# Patient Record
Sex: FEMALE | Race: BLACK OR AFRICAN AMERICAN
[De-identification: names, ages, dates, MRNs, and addresses within clinical notes are randomized per-mention and may not be internally consistent; named-entity substitution may affect disease eponyms.]

---

## 2021-02-16 ENCOUNTER — HOSPITAL ENCOUNTER (OUTPATIENT)
Dept: HOSPITAL 35 - PAIN | Age: 31
End: 2021-02-16
Attending: ANESTHESIOLOGY
Payer: COMMERCIAL

## 2021-02-16 VITALS — DIASTOLIC BLOOD PRESSURE: 68 MMHG | SYSTOLIC BLOOD PRESSURE: 118 MMHG

## 2021-02-16 VITALS — HEIGHT: 64.02 IN | WEIGHT: 160 LBS | BODY MASS INDEX: 27.31 KG/M2

## 2021-02-16 DIAGNOSIS — R20.2: ICD-10-CM

## 2021-02-16 DIAGNOSIS — M54.2: ICD-10-CM

## 2021-02-16 DIAGNOSIS — M79.605: ICD-10-CM

## 2021-02-16 DIAGNOSIS — M54.5: Primary | ICD-10-CM

## 2021-02-16 DIAGNOSIS — M41.9: ICD-10-CM

## 2021-02-16 NOTE — NUR
Pain Clinic Assessment:
 
1. History of Osteoarthritis:
Not Applicable
   History of Rheumatoid Arthritis:
Not Applicable
 
2. Height: 5 ft. 4 in. 162.6 cm.
   Weight: 160.0 lb.  oz. 72.576 kg.
   Patient's BMI: 27.5
 
3. Vital Signs:
   BP: 118/68 Pulse: 94 Resp: 14
   Temp:  02 Sat: 99 ECG Mon:
 
4. Pain Intensity: 7
 
5. Fall Risk:
   Dizziness: N  Needs help standing or walking: N
   Fallen in the last 3 months: N
   Fall risk comments:
 
 
6. Patient on Blood Thinner: None
 
7. History of Hypertension: N
 
8. Opioid Therapy greater than 6 weeks: N
   Opiate Contract Signed:
 
9. Risk Assessment Tool Provided: MODERATE
 
10. Functional Assessment Tool: 67/70
 
11. Recreational Drug Use: Current within past 3 mos Drug Type: MARIJUANIA
    Tobacco Use: Never Smoker Tobacco Type:
       Amount or Packs/day:  How Many Years:
    Alcohol Use: Yes  Frequency: Daily Quant: 1

## 2021-02-17 NOTE — HPC
[facility]
[address]
[place]   [address]                     PAIN MANAGEMENT CONSULTATION  
_______________________________________________________________________________
 
Name:       DADA CORDOVA                 Room #:                     REG ANNE SUAREZYaniraJASONYanira#:      [id number]                       Account #:      [id number]  
Admission:  02/16/21    Attend Phys:    Bonifacio Noriega DO  
Discharge:              Date of Birth:  [birth year]  
                                                          Report #: [id number]
                                                                    [id number]   
_______________________________________________________________________________
THIS REPORT FOR:  
 
cc:  Moreno Russell MD,Moreno Noriega,Bonifacio MOREIRA DO                                          ~
DATE OF SERVICE:  02/16/2021
 
 
CHIEF COMPLAINT:  Low back pain, left lower extremity pain with paresthesias,
intermittent mid back pain and neck pain.
 
HISTORY OF PRESENT ILLNESS:  As you know, the patient is a 30-year-old female
who reports longstanding history of low back pain, left anterior thigh pain
beginning 10/01/2009.  The patient reports no specific injury or trauma that
have led to that symptom development.  She has trialled over-the-counter
medications, rest, relaxation without improvement in symptoms.  She has been
treated by multiple physicians over the past couple of years seeking
chiropractic manipulation, massage therapy.  She has been started and stopped on
Flexeril, naproxen, ibuprofen and Robaxin.  She has had fentanyl patches and
utilizes marijuana.  Despite all these potential treatment options she continued
to experience pain.  She sought evaluation through neurosurgery, who advised the
patient there is no surgical necessity and she only has mild scoliotic
curvature.  She was subsequently referred to our clinic to discuss undergoing
interventional treatments to address axial back pain and left lower extremity
pain.
 
The patient reports today her pain is continuous, steady and constant with
intermittent exacerbations.  She states her pain is shooting, cramping, aching,
crushing, pulling, throbbing, pounding, sharp, stabbing and tender, places
current pain score 7/10, daily average anywhere from 8-10/10, worst pain has
been is 16/10.  The patient states pain is exacerbated with "anything."  Nothing
tends to improve pain.  The patient has recently started physical therapy and
states that this makes her pain worse and I encouraged her to continue this
treatment.  She has been referred to our clinic to discuss interventional
treatment options.
 
PAST MEDICAL HISTORY:
1.  Asthma.
2.  Hypothyroidism.
3.  Chronic back pain.
 
PAST SURGICAL HISTORY:  None.
 
SOCIAL HISTORY:  The patient denies tobacco use.  She smokes marijuana to help
with her back symptoms.  She admits to 1 alcohol beverage per day.  She is a
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
 
 
 
[address]   [address]                     PAIN MANAGEMENT CONSULTATION  
_______________________________________________________________________________
 
Name:       DADA CORDOVA                 Room #:                     REG CLVirtua MarltonYanira#:      [id number]                       Account #:      [id number]  
Admission:  02/16/21    Attend Phys:    Bonifacio Noriega DO  
Discharge:              Date of Birth:  [birth year]  
                                                          Report #: [id number]
                                                                    [id number]   
_______________________________________________________________________________
is she trying to obtain discrete benefits.  She is not in litigation in regards
to pain.  She is unaccompanied today.
 
REVIEW OF SYSTEMS:  Positive for eye disease, wearing corrective eyewear,
blurred and double vision, asthma, wheezing, abdominal pain, irregular menses,
lightheadedness and dizziness, numbness and tingling sensations, history of
paralysis, psychogenic nervousness, depression, thyroid disease.  All other
review of systems negative per 12-point review of systems other than those
listed in history of present illness.  Pain impact score 67 of 70, near complete
interference of daily activities secondary to pain.
 
ALLERGIES:  No known drug allergies.
 
CURRENT MEDICATIONS:  Lidoderm patch apply topically every 12 hours,
diphenhydramine 25 mg p.o. at bedtime, methocarbamol 750 mg b.i.d., naproxen 500
mg t.i.d., cyclobenzaprine 10 mg t.i.d. p.r.n.
 
IMAGING:  X-ray of the lumbar spine obtained on 01/25/2021 shows mild
thoracolumbar scoliosis with curvature with convexity to the right, vertebral
heights are well maintained,  mild disk narrowing at the L5-S1 level.
 
PHYSICAL EXAMINATION:
VITAL SIGNS:  Blood pressure 118/68, pulse 94, respiratory rate 14 and
unlabored.  The patient is 99% on room air.  Height 5 feet 4 inches tall, weight
160 pounds, BMI calculated 27.5.
GENERAL:  Well-developed, well-nourished, well-hydrated 30-year-old female
appearing her stated age.  She is in no acute distress, awake, alert and
oriented x 3.  Current pain is rated at 7/10.
HEENT:  Normocephalic, atraumatic.  Pupils equal, round, and responsive. 
Extraocular muscles are intact.  Sclerae nonicteric without injection.  Speech
appears fluent.  The patient is wearing a mask in compliance with COVID-19
regulations.  Trachea is midline.  No palpable masses, or swelling.
LUNGS:  Clear, no wheeze, rhonchi or rales.
CARDIOVASCULAR:  Regular.  No appreciable gallop, no rub.
ABDOMEN:  Soft, nontender, nondistended, normoactive bowel sounds.
EXTREMITIES:  Show no clubbing, no cyanosis, no edema.
MUSCULOSKELETAL:  Lower extremity strength equal and symmetrical 5/5, intact to
light touch from L1 through S2 dermatomes.  Seated straight leg raising
negative.  Supine straight leg raising negative.  Amita test is negative. 
Modified Gaenslen's positive for axial low back pain.  Ankle clonus negative. 
Babinski is negative.  Romberg's negative.  The patient is able to toe walk,
heel walk and tandem gait without any issues.  Reflexes are 2+/4 bilaterally in
the patella and Achilles as well as the biceps, brachioradialis and triceps.
 
ASSESSMENT:
1.  Chronic low back pain.
 
 
 
[facility]
[address]
[place]   [address]                     PAIN MANAGEMENT CONSULTATION  
_______________________________________________________________________________
 
Name:       DADA CORDOVA                 Room #:                     REG ANNE AYALA#:      [id number]                       Account #:      [id number]  
Admission:  02/16/21    Attend Phys:    Bonifacio Noriega DO  
Discharge:              Date of Birth:  [birth year]  
                                                          Report #: [id number]
                                                                    [id number]   
_______________________________________________________________________________
2.  Left lower extremity pain.
3.  Mild dextroscoliosis.
4.  Chronic intractable pain.
 
PLAN:
1.  The patient has been referred to our service by her neurosurgery team to
discuss interventional treatment options to address axial back and left lower
extremity symptoms.  I was unable to elicit any type of radicular component of
the patient's symptoms today.  She does have some myofascial tenderness over the
paraspinal musculature of the thoracic and lumbar area, but no spinous process
tenderness.  The patient is complaining of pain beginning in the low back and
radiating down the left leg, which could be due to mild changes noted at the
L5-S1 level.  We discussed with the patient that treatment options would include
the following.
2.  We discussed physical therapy, stretching exercises and core strengthening
as the gold standard and likely the most effective treatment option.  We
discussed utilization of nonsteroidal anti-inflammatories.  We would strongly
suggest no opioid medication as there is no noted pathology, but we will defer
to the primary team who has been writing opioid medications in the past.  We
concur with Dr. Russell in regards to the use of opioid medications as ineffectual
treating current symptoms.  We discussed interventional treatment options for
which the patient was referred to our clinic.  These would include epidural
injections and trigger point injections.  We also discussed with the patient the
surgical options that she has sought which has been advised she is nonsurgical
candidate.  After reviewing the risks and benefits of all the proposed treatment
options, the patient chose to begin with an epidural injection.
3.  The patient will establish an appointment to see us back to undergo first in
a series of lumbar epidural injections to address low back and left lower
extremity symptoms.  We are hopeful the patient can undergo the procedure quite
quickly.  We will discuss her case with her third party payer to confirm that
authorizations are completed if they are necessary and have her return next week
for the first in a series of epidural injections.  The patient is to clear her
schedule after the injection to go home and take it easy for at least the first
24 hours.  The patient is agreeable with plan.  She will make an appointment
next week to undergo the first in a series of epidural injections.
4.  No medication changes made at today's visit.  We recommend the patient
remain on current medications.  She is on Lidoderm patch and naproxen, which are
appropriate and consistent with the patient's symptoms.
5.  We plan to see the patient back in followup visit once we have confirmed
authorizations that may be necessary for the epidural injection and the patient
is able to clear her schedule to undergo the procedure.  We will keep you
apprised of response once this procedure has been performed.
6.  We wish to thank Dr. Russell for the referral of patient to our clinic.  We
will keep you apprised of response to treatment and whether or not she sees
analgesic benefit with the treatment options provided.  It does appear that
based on Dr. Russell's notes that if she does not see improvement, she is to
 
 
 
[address]   [address]                     PAIN MANAGEMENT CONSULTATION  
_______________________________________________________________________________
 
Name:       DADA CORDOVA                 Room #:                     REG ANNE PAULYanira#:      [id number]                       Account #:      [id number]  
Admission:  02/16/21    Attend Phys:    Bonifacio Noriega DO  
Discharge:              Date of Birth:  [birth year]  
                                                          Report #: [id number]
                                                                    [id number]   
_______________________________________________________________________________
follow up with Dr. Quintero in regards to surgical options if they are to be
entertained.  Again, we wish to thank Dr. Russell for the referral of the patient
to our clinic.
 
 
 
 
 
 
 
 
 
 
 
 
 
 
 
 
 
 
 
 
 
 
 
 
 
 
 
 
 
 
 
 
 
 
 
 
 
 
 
 
 
  <ELECTRONICALLY SIGNED>
   By: Bonifacio Noriega DO          
  02/17/21     1142
D: 02/16/21 1451                           _____________________________________
T: 02/16/21 1756                           Bonifacio Noriega DO            /nt

## 2021-02-23 ENCOUNTER — HOSPITAL ENCOUNTER (OUTPATIENT)
Dept: HOSPITAL 35 - PAIN | Age: 31
Discharge: HOME | End: 2021-02-23
Attending: ANESTHESIOLOGY
Payer: COMMERCIAL

## 2021-02-23 VITALS — DIASTOLIC BLOOD PRESSURE: 77 MMHG | SYSTOLIC BLOOD PRESSURE: 109 MMHG

## 2021-02-23 VITALS — BODY MASS INDEX: 22.91 KG/M2 | HEIGHT: 64.02 IN | WEIGHT: 134.2 LBS

## 2021-02-23 DIAGNOSIS — M79.605: ICD-10-CM

## 2021-02-23 DIAGNOSIS — G89.29: ICD-10-CM

## 2021-02-23 DIAGNOSIS — M54.5: Primary | ICD-10-CM

## 2021-02-23 DIAGNOSIS — Z79.899: ICD-10-CM

## 2021-02-23 NOTE — NUR
Pain Clinic Assessment:
 
1. History of Osteoarthritis:
Not Applicable
   History of Rheumatoid Arthritis:
Not Applicable
 
2. Height: 5 ft. 4 in. 162.6 cm.
   Weight: 134.2 lb.  oz. 60.873 kg.
   Patient's BMI: 23.0
 
3. Vital Signs:
   BP: 109/77 Pulse: 101 Resp: 14
   Temp:  02 Sat: 100 ECG Mon:
 
4. Pain Intensity: 7
 
5. Fall Risk:
   Dizziness: N  Needs help standing or walking: N
   Fallen in the last 3 months: N
   Fall risk comments:
 
 
6. Patient on Blood Thinner: None
 
7. History of Hypertension: N
 
8. Opioid Therapy greater than 6 weeks: N
   Opiate Contract Signed:
 
9. Risk Assessment Tool Provided: MODERATE
 
10. Functional Assessment Tool: 67/70
 
11. Recreational Drug Use: Current within past 3 mos Drug Type: MARIJUANA
    Tobacco Use: Never Smoker Tobacco Type:
       Amount or Packs/day:  How Many Years:
    Alcohol Use: Yes  Frequency: Weekly Quant: 1-2

## 2021-02-24 NOTE — HPC
[facility]
[place]   [address]                     PAIN MANAGEMENT CONSULTATION  
_______________________________________________________________________________
 
Name:       DADA CORDOVA                 Room #:                     REG ANNE SUAREZYaniraJASON.#:      [id number]                       Account #:      [id number]  
Admission:  02/23/21    Attend Phys:    Bonifacio Noriega DO  
Discharge:              Date of Birth:  [birth year]  
                                                          Report #: [id number]
                                                                    [id number]   
_______________________________________________________________________________
THIS REPORT FOR:  
 
cc:  Morneo Russell MD,Moreno Noriega,Bonifacio MOREIRA DO                                          ~
DATE OF SERVICE:  02/23/2021
 
 
REFERRING PHYSICIAN:  Moreno Russell MD
 
CHIEF COMPLAINT:  Low back pain, left lower extremity pain with paresthesias.
 
HISTORY OF PRESENT ILLNESS:  As you know, the patient is a 30-year-old female
with longstanding history of chronic low back pain, left anterior thigh pain
beginning 10/01/2009.  No specific injury or trauma was noted to be the source
of symptoms.  She has trialled various treatment options ultimately seeking
treatment through Neurosurgery, who advised the patient to trial more
conservative treatment options initially.  She was seen in consultation per the
request of Dr. Russell on 02/16/2021 due to third party payer restrictions,
authorization had to be obtained before the patient could undergo first in a
series of lumbar epidural injections.  She also needed to clear her schedule, to
be able to rest and relax after the injection for at least the first 24 hours. 
She returns today in followup visit reporting a pain score of 7/10, requesting
to undergo lumbar epidural injection under fluoroscopic guidance.
 
ALLERGIES:  NO DRUG ALLERGIES.
 
CURRENT MEDICATIONS:  Lidoderm patch apply topically every 12 hours,
diphenhydramine 25 mg p.o. at bedtime, methocarbamol 750 mg b.i.d., naproxen 500
mg t.i.d., cyclobenzaprine 10 mg p.r.n.
 
SOCIAL HISTORY:  The patient denies tobacco use.  She smokes marijuana.  Denies
IV or illicit drug use.  Admits to one alcohol beverage per day.  She is a
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
 
IMAGING STUDIES:  No new imaging available.
 
PHYSICAL EXAMINATION:
VITAL SIGNS:  Blood pressure 109/77, pulse 101, respiratory rate 14 and
unlabored.  The patient is 100% on room air.  Height 5 feet 4 inches tall,
weight 134.2 pounds, BMI calculated 23.0.
GENERAL:  Well-developed, well-nourished, well-hydrated 30-year-old female
appearing stated age, pain is rated around 7/10.
HEENT:  Normocephalic and atraumatic.  Pupils are round and responsive.  The
patient is wearing a mask in compliance with COVID-19 regulations.
EXTREMITIES:  Show no clubbing, no cyanosis, no edema.
 
 
 
[place] [address]                     PAIN MANAGEMENT CONSULTATION  
_______________________________________________________________________________
 
Name:       DADA CORDOVA                 Room #:                     REG CLSTEFFI PAUL.#:      [id number]                       Account #:      [id number]  
Admission:  02/23/21    Attend Phys:    Bonifacio Noriega DO  
Discharge:              Date of Birth:  [birth year]  
                                                          Report #: [id number]
                                                                    [id number]   
_______________________________________________________________________________
MUSCULOSKELETAL:  Seated straight leg raising negative.  Supine straight leg
raising negative.  Amita's test negative.  Modified Gaenslen's positive for some
axial low back pain.  Ankle clonus negative.  Babinski is negative.
 
ASSESSMENT:
1.  Chronic low back pain.
2.  Left lower extremity pain.
3.  Mild dextroscoliosis.
4.  Chronic intractable pain.
 
PLAN:
1.  The patient returns today in followup visit to undergo lumbar epidural
injection under fluoroscopic guidance per the request of her referring
neurosurgeon, Dr. Moreno Russell to address axial back pain, left lower extremity
pain with paresthesias.  The patient has been advised of the risks and benefits
of a lumbar epidural injection.  These risks include but are not necessarily
limited to bleeding, bruising, infection, worsening pain, no relief of pain,
also risk of temporary or permanent muscle weakness, temporary or permanent
nerve damage, possible paralysis, post-dural puncture headache and death.  The
patient states understood and wished to proceed.
2.  No medication changes made at today's visit.  The patient will continue
current medical therapy as prior prescribed.
3.  We plan to see the patient back in followup visit in 31 days to discuss the
efficacy of today's epidural injection and determine if next in the series would
be recommended.
 
PROCEDURE NOTE
 
DESCRIPTION OF PROCEDURE:  Lumbar epidural steroid injection under fluoroscopic
guidance.
 
This is the first procedure of the first series that the patient is undergoing.
 
After obtaining written consent, the patient was taken back to the fluoroscopy
suite, placed in a prone position with pillow under the abdomen to decrease
lumbar lordosis.  The skin overlying the lumbosacral area was then prepped and
draped in aseptic fashion.  The L5-S1 vertebral interspace was then identified
by AP fluoroscopy.  The skin and subcutaneous tissue overlying the target site
of injection was anesthetized with 3 mL 1% lidocaine.
 
A(n) 20-gauge 3-1/2 inch Tuohy needle was then advanced under fluoroscopic
guidance towards the epidural space using a midline approach.  The epidural
space was identified using loss of resistance to air technique.  After negative
aspiration for heme or cerebrospinal fluid, a total of 1 mL of Omnipaque was
injected.  A lumbar epidurogram was confirmed using both AP and lateral 
fluoroscopy.  After negative aspiration for heme or cerebrospinal fluid, 5 mL of
 
 
 
[address]   [address]                     PAIN MANAGEMENT CONSULTATION  
_______________________________________________________________________________
 
Name:       DADA CORDOVA                 Room #:                     REG ANNE AYALA#:      [id number]                       Account #:      [id number]  
Admission:  02/23/21    Attend Phys:    Bonifacio Noriega DO  
Discharge:              Date of Birth:  [birth year]  
                                                          Report #: [id number]
                                                                    [id number]   
_______________________________________________________________________________
a solution containing 2 mL 40 mg per mL, 80 mg total triamcinolone along with 3
mL of lidocaine 1% was injected in increments.  Contrast spread was noted
posterior epidural space.  The needle was then retracted approximately half way
and needle tract flushed with 1 mL of 1% lidocaine.  Needle was then removed. 
There were no apparent sensory or motor deficits in the lower extremity
following the procedure.  A sterile bandage was placed over the injection site.
The heart rate, pulse, oximetry and blood pressure were continuously monitored
after the procedure.  There were no complications.  The patient tolerated the
procedure well and was carefully escorted to the recovery room in stable
condition.  There were no apparent complications.  After meeting discharge
criteria, the patient was then discharged home.
 
 
 
 
 
 
 
 
 
 
 
 
 
 
 
 
 
 
 
 
 
 
 
 
 
 
 
 
 
 
 
 
 
  <ELECTRONICALLY SIGNED>
   By: Bonifacio Noriega DO          
  02/24/21     1153
D: 02/23/21 1117                           _____________________________________
T: 02/23/21 1158                           Bonifacio Noriega DO            /nt

## 2021-03-30 ENCOUNTER — HOSPITAL ENCOUNTER (OUTPATIENT)
Dept: HOSPITAL 35 - PAIN | Age: 31
Discharge: HOME | End: 2021-03-30
Attending: ANESTHESIOLOGY
Payer: COMMERCIAL

## 2021-03-30 VITALS — DIASTOLIC BLOOD PRESSURE: 80 MMHG | SYSTOLIC BLOOD PRESSURE: 118 MMHG

## 2021-03-30 VITALS — BODY MASS INDEX: 23.56 KG/M2 | HEIGHT: 64.02 IN | WEIGHT: 138 LBS

## 2021-03-30 DIAGNOSIS — M79.605: ICD-10-CM

## 2021-03-30 DIAGNOSIS — M54.5: Primary | ICD-10-CM

## 2021-03-30 DIAGNOSIS — G89.29: ICD-10-CM

## 2021-03-30 DIAGNOSIS — Z79.899: ICD-10-CM

## 2021-03-30 DIAGNOSIS — M41.86: ICD-10-CM

## 2021-03-30 DIAGNOSIS — Z98.890: ICD-10-CM

## 2021-03-30 NOTE — NUR
Pain Clinic Assessment:
 
1. History of Osteoarthritis:
Not Applicable
   History of Rheumatoid Arthritis:
Not Applicable
 
2. Height: 5 ft. 4 in. 162.6 cm.
   Weight: 138.0 lb.  oz. 62.596 kg.
   Patient's BMI: 23.7
 
3. Vital Signs:
   BP: 118/80 Pulse: 80 Resp: 16
   Temp:  02 Sat: 100 ECG Mon:
 
4. Pain Intensity: 7.5
 
5. Fall Risk:
   Dizziness: N  Needs help standing or walking: N
   Fallen in the last 3 months: N
   Fall risk comments:
 
 
6. Patient on Blood Thinner: None
 
7. History of Hypertension: N
 
8. Opioid Therapy greater than 6 weeks: N
   Opiate Contract Signed:
 
9. Risk Assessment Tool Provided: MODERATE-4
 
10. Functional Assessment Tool: 67/70
 
11. Recreational Drug Use: Current within past 3 mos Drug Type: marijuana
    Tobacco Use: Never Smoker Tobacco Type:
       Amount or Packs/day:  How Many Years:
    Alcohol Use: Yes  Frequency: Weekly Quant: 1-2

## 2021-04-06 NOTE — HPC
[address] [place]   [address]                     PAIN MANAGEMENT CONSULTATION  
_______________________________________________________________________________
 
Name:       DADA CORDOVA                 Room #:                     REG ANNE SUAREZYaniraJASONYanira#:      [id number]                       Account #:      [id number]  
Admission:  03/30/21    Attend Phys:    Bonifacio Noriega DO  
Discharge:              Date of Birth:  [birth year]  
                                                          Report #: [id number]
                                                                    [id number]   
_______________________________________________________________________________
THIS REPORT FOR:  
 
cc:  Moreno Russell MD,Moreno Noriega,Bonifacio MOREIRA DO                                          ~
DATE OF SERVICE:  03/30/2021
 
 
CHIEF COMPLAINT:  Low back pain, left lower extremity pain with paresthesias.
 
HISTORY OF PRESENT ILLNESS:  As you know, the patient is a 30-year-old female
with longstanding history of chronic low back pain, anterior thigh pain that
presented 10/01/2009.  The patient denies any specific injury or trauma.  She
sought evaluation through Neurosurgery and advised to trial conservative
treatment initially to determine if her symptoms are amenable to a more
conservative approach before looking toward surgical options.  We saw the
patient in consultation, 02/16/2021, diagnosed with chronic low back pain, left
lower extremity pain and underwent an epidural injection under fluoroscopic
guidance on 02/23/2021.  She returns today in followup visit indicating that she
received improvement in symptoms of about 90%, lasting for about 3 weeks.  She
returns today in followup visit requesting to undergo next in the series of
lumbar epidural injections to address suspected lumbar radiculopathy.  She is
also requesting a possible adjustment in medication management.  Today, the
patient is placing pain score 7.5/10.
 
ALLERGIES:  No known drug allergies.
 
CURRENT MEDICATIONS:  Lidoderm patch apply topically every 12 hours,
diphenhydramine 25 mg once a day, methocarbamol 750 mg b.i.d., naproxen 500 mg
t.i.d., cyclobenzaprine 10 mg p.r.n.
 
SOCIAL HISTORY:  The patient denies tobacco, continues to smoke marijuana on a
near daily basis.  Denies IV or illicit drug use.  Admits to 1 alcohol beverage
per day.  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
 
IMAGING:  No new imaging available.
 
PHYSICAL EXAMINATION:
VITAL SIGNS:  Blood pressure 118/80, pulse 80, respiratory rate 16 and
unlabored.  The patient is 100% on room air.  Height 5 feet 4 inches tall,
weight 138 pounds, BMI calculated 23.7.
GENERAL:  A well-developed, well-nourished, well-hydrated 30-year-old female
appearing stated age, pain is rated today 7.5/10.
HEENT:  Normocephalic, atraumatic.  Pupils equal, round, and responsive.
EXTREMITIES:  Show no clubbing, no cyanosis.  No appreciable edema.
MUSCULOSKELETAL:  Seated straight leg raising negative.  Supine straight leg
 
 
 
[address]   [address]                     PAIN MANAGEMENT CONSULTATION  
_______________________________________________________________________________
 
Name:       DADA CORDOVA                 Room #:                     REG ANNE AYALA#:      [id number]                       Account #:      [id number]  
Admission:  03/30/21    Attend Phys:    Bonifacio Noriega DO  
Discharge:              Date of Birth:  [birth year]  
                                                          Report #: [id number]
                                                                    [id number]   
_______________________________________________________________________________
raising negative.  Amita's test is negative.  Gait is normal.  Modified
Gaenslen's positive for some mild axial low back pain.  Ankle clonus negative. 
Babinski is negative.
 
ASSESSMENT:
1.  Chronic low back pain.
2.  Left lower extremity pain.
3.  Mild dextroscoliosis.
4.  Chronic intractable pain.
 
PLAN:
1.  The patient returns today in followup visit requesting to undergo epidural
injection under fluoroscopic guidance.  She reports a 90% improvement in overall
pain with the epidural injection provided on 02/23/2021 and unfortunately, her
symptoms recurred.  She returns for the next in the series of epidural
injections in hopes of improving pain.  She has been advised risks and benefits
of the procedure, states understood and wished to proceed.  The patient was
advised to today's injection is the second in the series of epidural injections
in the 6 months.  We have one remaining epidural injection to provide to the
patient through 08/23/2020.  The patient and I did discuss that we should delay
the next in the series of injections as long as possible.
2.  The patient was provided a prescription of gabapentin 300 mg dose.  She will
begin 1 tab p.o. at bedtime.  Continue for 3 nights and then may escalate to 600
mg.  We will provide the patient the medication with the understanding that she
will follow up with her PCP to continue the therapy.  The patient will watch for
side effects of sleepiness, disorientation, confusion, mental slowing with use
of this medication.  She is not to take this medication with any other sedating
substances.
3.  We will see the patient back in followup visit for the next in the series of
epidural injections.  If the patient notes improvement, but her symptoms do
reoccur, we have one remaining epidural injection.  If we only see transient
improvement in symptoms with this injection, I would recommend follow up with
Neurosurgery to discuss her options.
 
DESCRIPTION OF PROCEDURE:  L5-S1 interlaminar epidural steroid injection under
fluoroscopic guidance.
 
This is the second procedure of the first series that the patient is undergoing.
 
After obtaining written consent, the patient was taken back to the fluoroscopy
suite, placed in a prone position with pillow under the abdomen to decrease
lumbar lordosis.  The skin overlying the lumbosacral area was then prepped and
draped in aseptic fashion.  The L5-S1 vertebral interspace was then identified
by AP fluoroscopy.  The skin and subcutaneous tissue overlying the target site
of injection was anesthetized with 3 mL 1% lidocaine.
 
 
 
 
[facility]
[address]
[place]   [address]                     PAIN MANAGEMENT CONSULTATION  
_______________________________________________________________________________
 
Name:       DADA CORDOVA                 Room #:                     REG ANNE AYALA#:      [id number]                       Account #:      [id number]  
Admission:  03/30/21    Attend Phys:    Bonifacio Noriega DO  
Discharge:              Date of Birth:  [birth year]  
                                                          Report #: [id number]
                                                                    [id number]   
_______________________________________________________________________________
A 20-gauge 3-1/2 inch Tuohy needle was then advanced under fluoroscopic guidance
towards the epidural space using a right parasagittal approach.  The epidural
space was identified using loss of resistance to air technique.  After negative
aspiration for heme or cerebrospinal fluid, a total of 1 mL of Omnipaque was
injected.  A lumbar epidurogram was confirmed using both AP and lateral
fluoroscopy.  After negative aspiration for heme or cerebrospinal fluid, 5 mL of
a solution containing 2 mL 40 mg per mL, 80 mg total triamcinolone along with 3
mL of lidocaine 1% mL was injected in increments.  Contrast spread was noted in
posterior epidural space.  The needle was then retracted approximately half way
and needle tract flushed with 1 mL of 1% lidocaine.  Needle was then removed. 
There were no apparent sensory or motor deficits in the lower extremity
following the procedure.  A sterile bandage was placed over the injection site.
The heart rate, pulse, oximetry and blood pressure were continuously monitored
after the procedure.  There were no apparent complications.  The patient
tolerated the procedure well and was carefully escorted to the recovery room in
stable condition.  There were no apparent complications.  After meeting
discharge criteria, the patient was then discharged home.
 
 
 
 
 
 
 
 
 
 
 
 
 
 
 
 
 
 
 
 
 
 
 
 
 
 
 
  <ELECTRONICALLY SIGNED>
   By: Bonifacio Noriega DO          
  04/06/21     0806
D: 03/31/21 0822                           _____________________________________
T: 03/31/21 1514                           Bonifacio Noriega DO            /nt

## 2021-05-11 ENCOUNTER — HOSPITAL ENCOUNTER (OUTPATIENT)
Dept: HOSPITAL 35 - PAIN | Age: 31
Discharge: HOME | End: 2021-05-11
Attending: ANESTHESIOLOGY
Payer: COMMERCIAL

## 2021-05-11 VITALS — DIASTOLIC BLOOD PRESSURE: 74 MMHG | SYSTOLIC BLOOD PRESSURE: 114 MMHG

## 2021-05-11 VITALS — BODY MASS INDEX: 25.13 KG/M2 | WEIGHT: 147.2 LBS | HEIGHT: 64.02 IN

## 2021-05-11 DIAGNOSIS — M79.605: ICD-10-CM

## 2021-05-11 DIAGNOSIS — M41.86: ICD-10-CM

## 2021-05-11 DIAGNOSIS — M54.5: Primary | ICD-10-CM

## 2021-05-11 DIAGNOSIS — Z79.899: ICD-10-CM

## 2021-05-11 DIAGNOSIS — M79.604: ICD-10-CM

## 2021-05-11 DIAGNOSIS — Z98.890: ICD-10-CM

## 2021-05-11 DIAGNOSIS — G89.29: ICD-10-CM

## 2021-05-11 NOTE — NUR
Pain Clinic Assessment:
 
1. History of Osteoarthritis:
Not Applicable
   History of Rheumatoid Arthritis:
Not Applicable
 
2. Height: 5 ft. 4 in. 162.6 cm.
   Weight: 147.2 lb.  oz. 66.769 kg.
   Patient's BMI: 25.3
 
3. Vital Signs:
   BP: 114/74 Pulse: 76 Resp: 14
   Temp:  02 Sat: 100 ECG Mon:
 
4. Pain Intensity: 9
 
5. Fall Risk:
   Dizziness: N  Needs help standing or walking: N
   Fallen in the last 3 months: N
   Fall risk comments:
 
 
6. Patient on Blood Thinner: None
 
7. History of Hypertension: N
 
8. Opioid Therapy greater than 6 weeks: N
   Opiate Contract Signed:
 
9. Risk Assessment Tool Provided: MODERATE-4
 
10. Functional Assessment Tool: 67/70
 
11. Recreational Drug Use: Current within past 3 mos Drug Type: MARIJUANA
    Tobacco Use: Never Smoker Tobacco Type:
       Amount or Packs/day:  How Many Years:
    Alcohol Use: Yes  Frequency: Special Occasions Quant: OCCASIONAL

## 2021-05-12 NOTE — HPC
[address]   [address]                     PAIN MANAGEMENT CONSULTATION  
_______________________________________________________________________________
 
Name:       DADA CORDOVA                 Room #:                     REG ANNE SUAREZYaniraJASON.#:      [id number]                       Account #:      [id number]  
Admission:  05/11/21    Attend Phys:    Bonifacio Noriega DO  
Discharge:              Date of Birth:  [birth year]  
                                                          Report #: [id number]
                                                                    [id number] 
_______________________________________________________________________________
THIS REPORT FOR:  
 
cc:  Moreno Russell MD,Moreno Noriega,Bonifacio MOREIRA DO                                          ~
DOC #: [id number]
 
 
DATE OF SERVICE: 05/11/2021
 
CHIEF COMPLAINT:  Low back pain, left lower extremity pain with paresthesias.
 
HISTORY OF PRESENT ILLNESS:  As you know, the patient is a 30-year-old female 
with longstanding history of chronic low back pain, anterior thigh pain, who was
referred to our clinic by her neurosurgeon, Dr. Moreno Russell to trial epidural 
injections under fluoroscopic guidance.  We saw the patient in consultation per 
Dr. Russell's request.  In 02/2021, she underwent a lumbar epidural injection 
under fluoroscopic guidance, returning in one month, undergoing the second in 
the series.  She reports the second in the series of epidural injections 
provided about 50% improvement in overall pain.  Unfortunately, her symptoms 
have begun to return.  She returns today to undergo the third and final in the 
series of epidural injections in 6 months.  She is reporting pain score today of
the level 9/10.  She has suffered no injury, no trauma or any changes in medical
management since our last visit.
 
ALLERGIES:  No known drug allergies.
 
CURRENT MEDICATIONS:  Gabapentin 300 mg b.i.d., lidocaine patches applied 
topically, diphenhydramine 25 mg p.o. at bedtime, methocarbamol 750 mg once a 
day, naproxen 500 mg twice a day, cyclobenzaprine 10 mg t.i.d.
 
SOCIAL HISTORY:  The patient denies tobacco.  Denies IV drug use.  Smokes 
marijuana on a daily basis.  She admits to 1 alcohol beverage per day.  She is a
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
 
IMAGING:  No new imaging available.
 
PHYSICAL EXAMINATION:
VITAL SIGNS:  Blood pressure 114/74, pulse 76, respiratory rate 14 and 
unlabored.  The patient is 100% on room air.  Height 5 feet 4 inches tall, 
weight 147.2 pounds, BMI calculated 25.3.
GENERAL:  Well-developed, well-nourished, well-hydrated 30-year-old female.  She
appears her stated age.  She is in no acute distress.  Awake, alert and oriented
x3.  Current pain score is rated at 9/10.
HEENT:  Normocephalic, atraumatic.  Pupils are responsive.  The patient is 
deemed a good historian.  She is wearing a mask in compliance with COVID-19 
 
 
 
[place] [address]                     PAIN MANAGEMENT CONSULTATION  
_______________________________________________________________________________
 
Name:       DADA CORDOVA                 Room #:                     REG ANNE AYALA#:      [id number]                       Account #:      [id number]  
Admission:  05/11/21    Attend Phys:    Bonifacio Noriega DO  
Discharge:              Date of Birth:  [birth year]  
                                                          Report #: [id number]
                                                                    [id number] 
_______________________________________________________________________________
regulations.
EXTREMITIES:  Show no clubbing, no cyanosis.  No appreciable edema.
MUSCULOSKELETAL:  Lower extremity strength is symmetrical 5/5.  Muscle bulk and 
tone equal and symmetrical in comparing lower extremities.  Seated straight leg 
raising negative.  Supine straight leg raising negative.  Fabere's test is 
negative.
 
ASSESSMENT:
1.  Chronic low back pain.
2.  Right lower extremity pain.
3.  Mild dextroscoliosis.
4.  Chronic intractable pain.
5.  Left lower extremity pain.
 
PLAN:
1.  The patient returns today in followup visit to undergo the third in the 
series of lumbar epidural injections.  The most recent epidural injection gave 
50% improvement in overall pain.  Unfortunately, her symptoms have reoccurred 
now, reporting a pain score of 9/10.  She denies injury or trauma that led to 
the recurrence of pain.  She returns today to undergo the next in the series of 
lumbar epidural injections.  This is the final in the series of the 6 months.  
She has been advised of risks and benefits of the procedure, states understood 
and wished to proceed.
2.  No medication changes made at today's visit.  Recommend the patient continue
current medical therapy as prior prescribed.
3.  We will see the patient back in followup visit on an as needed basis.  The 
next available lumbar epidural injection will be 6 months from the initial 
injection of 02/23/2021, equating to 08/23/2021.
 
DESCRIPTION OF PROCEDURE:  L5-S1 interlaminar epidural steroid injection under 
fluoroscopic guidance.
 
After obtaining written consent, the patient was taken back to fluoroscopy 
suite, placed in prone position with pillow under abdomen to decrease lumbar 
lordosis.  Skin overlying lumbosacral area prepped and draped in aseptic 
fashion.  The L5-S1 vertebral interspace identified by AP fluoroscopy.  Skin and
subcutaneous tissue overlying target site injection anesthetized with 3 mL of 1%
lidocaine.
 
A 20 gauge 3-1/2 inch Tuohy needle advanced under fluoroscopic guidance towards 
the epidural space using a paramedian approach.  Epidural space identified using
loss of resistance to air technique.  After negative aspiration for heme or 
cerebrospinal fluid, 1 mL of Omnipaque injected.  Lumbar epidurogram was 
confirmed using both AP and lateral fluoroscopy.  After negative aspiration for 
heme or cerebrospinal fluid, 5 mL of solution containing 2 mL of 40 mg per mL 80
mg total triamcinolone along with 3 mL of lidocaine, 1% injected slowly.  Needle
 
 
 
[address]   [address]                     PAIN MANAGEMENT CONSULTATION  
_______________________________________________________________________________
 
Name:       DADA CORDOVA                 Room #:                     REG ANNE AYALA#:      [id number]                       Account #:      [id number]  
Admission:  05/11/21    Attend Phys:    Bonifacio Noriega DO  
Discharge:              Date of Birth:  [birth year]  
                                                          Report #: [id number]
                                                                    [id number] 
_______________________________________________________________________________
retracted FCI, flushed with 1 mL of 1% lidocaine, then removed.  Sterile 
bandage placed over injection site.  No new motor deficits present in lower 
extremity following procedure.
 
The patient tolerated the procedure well, carefully escorted to recovery room in
stable condition.  No apparent complications.  After meeting discharge criteria,
the patient discharged home.
 
Bonifacio Noriega DO
JEJ/DHI
 
D: 05/11/2021 04:30 PM
T: 05/12/2021 01:21 AM
 
 
 
 
 
 
 
 
 
 
 
 
 
 
 
 
 
 
 
 
 
 
 
 
 
 
 
 
 
 
 
  <ELECTRONICALLY SIGNED>
   By: Bonifacio Noriega DO          
  05/12/21     0759
D: 05/11/21 1530                           _____________________________________
T: 05/12/21 0021                           Bonifacio Noriega DO            /nt